# Patient Record
Sex: FEMALE | Race: WHITE | NOT HISPANIC OR LATINO | URBAN - NONMETROPOLITAN AREA
[De-identification: names, ages, dates, MRNs, and addresses within clinical notes are randomized per-mention and may not be internally consistent; named-entity substitution may affect disease eponyms.]

---

## 2017-05-16 NOTE — PATIENT DISCUSSION
Eyelid Lesion Counseling: The patient has presented with one or more eyelid or facial lesions with growth, change, irritation or abnormal appearance. An excision was recommended. Excision of the lesion was performed today and sent to pathology for definitive diagnosis. The patient has been given post procedure written instructions. The patient understands that the results of the pathology report will be back in approximately 7 days. The patient understands that an attempt to call and inform them of the result will be made within 2 weeks. If they do not receive a call they understand that they should contact our office for follow up.

## 2017-05-16 NOTE — PATIENT DISCUSSION
You have had a minor procedure in the office today. In terms of follow-up care, you should wash the affected area each time you wash your face. If the affected area is very close to the eye,  please wash it gently with a diluted baby shampoo. Additionally, please apply antibiotic ointment one time daily to the affected area. If an antibiotic ointment was prescribed to you, please use that ointment as directed. If there was no prescription given, then please use an over-the-counter antibiotic ointment you may have at home.

## 2017-05-16 NOTE — PATIENT DISCUSSION
If you have had a biopsy, a scab should develop over the area in 24 to 48 hours. The scab will persist for several days even up to a week and then fall off. You may discontinue all of these measures as soon as the scab has fallen off entirely. Please do not pick the scab but allow it to fall off naturally.

## 2017-05-16 NOTE — PATIENT DISCUSSION
The patient had a lesion on the right lower eyelid. After informed consent the lesion was anesthetized with local anesthetic, 1% lidocane with epinephrine 1:100,000 units. Sterile technique was used to remove the lesion with Sade scissors. Antibiotic ointment was used to treat the area where lesion was removed. Lesion was sent to pathology for analysis. The patient was given written post operative wound care instructions and a prescription for antibiotic ointment. The patient was asked to call  within 10 days if they had not been otherwise called by our office with the result of the biopsy.

## 2018-04-03 NOTE — PATIENT DISCUSSION
CONJUNCTIVITIS (ALLERGIC), OU:  PRESCRIBE PAZEO QAM OU FOR ITCH RELIEF. REMOVE CONTACT LENSES PRIOR TO USE. MAY SUB PATANOL BID OU IF NEEDED. RETURN FOR FOLLOW-UP AS SCHEDULED.

## 2018-04-03 NOTE — PATIENT DISCUSSION
CATARACTS, OU: BECOMING VISUALLY SIGNIFICANT AND BOTHERSOME TO PATIENT. IN-DEPTH DISCUSSION REGARDING DIAGNOSIS AND VISUAL LIMITATIONS WITH MONOVISION CONTACT LENSES AND CATARACTS DESPITE CL RX CHANGE. TRIAL LENSES SENT HOME WITH PATIENT OF NEW AS WELL AS OLD CONTACT LENS RX. PATIENT TO CALL IF SHE PREFERS HER OLD RX. IF SHE DESIRES TO PROCEED WITH CATARACT EVALUATION, SHE WILL SCHEDULE EVAL FOR THE FALL DUE TO HER WORK SCHEDULE. SPEC RX OFFERED. FOLLOW AS SCHEDULED.

## 2018-04-03 NOTE — PATIENT DISCUSSION
DRY EYE SYNDROME, OU: PRESCRIBED BLINK ARTIFICIAL TEARS BID - QID, OU. RETURN FOR FOLLOW-UP AS SCHEDULED OR SOONER IF SYMPTOMS WORSEN.

## 2018-05-07 NOTE — PATIENT DISCUSSION
CATARACT, OU - VISUALLY SIGNIFICANT. SCHEDULE PHACO WITH IOL OS FOR NEAR FIRST THEN OD SET FOR DISTANCE IF VISUAL SYMPTOMS PERSIST. GLASSES RX GIVEN TO FILL IF DESIRES IN THE EVENT PATIENT DOES NOT PROCEED WITH SURGERY. EXPLAINED TO PATIENT THAT SHE WILL HAVE TO WAIT 48 HOURS AFTER CATARACT SURGERY BEFORE DOING YOGA. PATIENT WOULD LIKE TO DECLINE IV SEDATION DURING CATARACT SURGERY.

## 2018-06-05 NOTE — PATIENT DISCUSSION
CATARACT, OU - VISUALLY SIGNIFICANT. SCHEDULE PHACO WITH IOL OS FOR NEAR FIRST THEN OD SET FOR DISTANCE IF VISUAL SYMPTOMS PERSIST.

## 2018-06-05 NOTE — PATIENT DISCUSSION
New Prescription: Durezol (difluprednate): drops: 0.05% 1 drop three times a day as directed into left eye 06-

## 2018-06-05 NOTE — PATIENT DISCUSSION
New Prescription: Besivance (besifloxacin): drops,suspension: 0.6% 1 drop three times a day as directed into left eye 06-

## 2018-06-20 NOTE — PATIENT DISCUSSION
Continue: Besivance (besifloxacin): drops,suspension: 0.6% 1 drop three times a day as directed into left eye 06-

## 2018-06-20 NOTE — PATIENT DISCUSSION
Continue: Durezol (difluprednate): drops: 0.05% 1 drop three times a day as directed into left eye 06-

## 2018-07-02 NOTE — PATIENT DISCUSSION
Continue to taper as directed in left eye. Start immediately after surgery in right eye sand continue as directed.

## 2018-07-02 NOTE — PATIENT DISCUSSION
*Pre-Op 2nd Eye Counseling: The patient has noticed an improvement in their visual symptoms in the operative eye. The patient complains of decreased vision in the fellow eye when driving and dealing with glare. It was explained to the patient that the decision to proceed with cataract surgery in the fellow eye is entirely a separate decision from the surgical eye. All of the same risks, benefits and alternatives ere reviewed with the patient again. The patient does feel the vision in the non-operative eye is limiting their daily activities and elects to proceed with surgery in the cataract eye.

## 2018-07-02 NOTE — PATIENT DISCUSSION
Continue as directed in left eye. Start two days prior to surgery in right eye and continue as directed.

## 2018-07-11 NOTE — PATIENT DISCUSSION
S/P PC IOL, OD : GOOD POST OP RESULT. STOP ANTIBIOTIC DROP IN ONE WEEK. CONTINUE OTHER DROPS AS DIRECTED UNTIL FURTHER INSTRUCTION. RETURN FOR FOLLOW-UP IN 2 WEEKS.

## 2018-08-02 NOTE — PATIENT DISCUSSION
S/P PE IOL, OU: DOING WELL. CONTINUE DROPS AS DIRECTED. SPECS RX OFFERED. RX ARC IN SPECS TO MINIMIZE GLARE. RETURN FOR FOLLOW-UP AS SCHEDULED.

## 2018-08-02 NOTE — PATIENT DISCUSSION
Post-Op Instructions OD: Durezol 1 time per day for 1 week, then discontinue. Ilevro (Nepafenac) 1 time per day for 1 week, then discontinue.

## 2018-10-04 NOTE — PATIENT DISCUSSION
POSTERIOR VITREOUS DETACHMENT, OD: NO HOLES OR TEARS 360' WITH INDIRECT EXAM OU. RETURN FOR FOLLOW-UP AS SCHEDULED.

## 2018-10-04 NOTE — PATIENT DISCUSSION
VISUAL DISTURBANCE, OS: SYMPTOMS ARE CONSISTENT WITH TEMPORAL INCISION FROM CATARACT SURGERY. PATIENT IS NOT BOTHERED AT THIS TIME. MONITOR.

## 2018-10-04 NOTE — PATIENT DISCUSSION
EYELID LESION, LOWER EYELID, OD:  HISTORY OF PREVIOUS LID LESION REMOVAL WITH DR. Jaclyn Ng. REFER TO DR Jaclyn Ng FOR EVALUATION.

## 2018-11-13 NOTE — PATIENT DISCUSSION
PCF, OU: BECOMING VISUALLY SIGNIFICANT AND BOTHERSOME TO PATIENT. REFER TO DR. GARZA FOR PCF EVAL OU.

## 2018-11-13 NOTE — PATIENT DISCUSSION
POSTERIOR VITREOUS DETACHMENT, OD: STABLE. NO HOLES OR TEARS 360' WITH INDIRECT EXAM OU. RETURN FOR FOLLOW-UP AS SCHEDULED.

## 2018-11-13 NOTE — PATIENT DISCUSSION
TINGLING OF TONGUE: DISCUSSED THAT IT WAS VERY UNLIKELY FOR POSTOPERATIVE DROPS TO BE CORRELATED WITH SYMPTOMS, ESPECIALLY NOW THAT SHE HASN'T USED THEM SINCE THIS SUMMER. RECOMMEND FOLLOWING UP WITH PCP IF SYMPTOMS DO NOT IMPROVE.

## 2018-11-19 NOTE — PATIENT DISCUSSION
ALLERGIC CONJUNCTIVITIS OU: CAN PRESCRIBE PAZEO TO USE QAM PRN FOR ITCHING AND IRRITATION. CALL IF NO IMPROVEMENT IN SYMPTOMS.

## 2018-11-26 NOTE — PATIENT DISCUSSION
NOAH, OD: SLOWLY RESOLVING. PRESCRIBED WARM COMPRESSES 10 MINUTES QID AND MAXITROL OINTMENT BID APPLIED WITH LID MASSAGE FOLLOWING WARM COMPRESSES. PATIENT ADVISED TO RETURN TO CLINIC FOR FOLLOW UP IN 1 MONTH WITH DR SOLANO IF THERE IS NOT FULL RESOLUTION, OR SOONER IF SYMPTOMS WORSEN.

## 2018-11-26 NOTE — PATIENT DISCUSSION
New Prescription: Maxitrol (neomycin-polymyxin-dexameth): ointment: 3.5-10,000-0.1 mg-unit/g-% a small amount twice a day into right eye 11-

## 2019-01-22 NOTE — PATIENT DISCUSSION
PHOTOGRAPHS: I have reviewed the external ocular photographs of this patient which show the following: moderate lower eyelid dermatochalasis

## 2019-02-14 ENCOUNTER — IMPORTED ENCOUNTER (OUTPATIENT)
Dept: URBAN - NONMETROPOLITAN AREA CLINIC 1 | Facility: CLINIC | Age: 44
End: 2019-02-14

## 2019-02-14 PROBLEM — H52.4: Noted: 2019-02-14

## 2019-02-14 PROBLEM — H52.03: Noted: 2021-11-22

## 2019-02-14 PROBLEM — H16.223: Noted: 2019-02-14

## 2019-02-14 PROBLEM — H52.13: Noted: 2019-02-14

## 2019-02-14 PROCEDURE — S0620 ROUTINE OPHTHALMOLOGICAL EXA: HCPCS

## 2019-02-14 NOTE — PATIENT DISCUSSION
MIKE OU- Discussed diagnosis in detail with patient- Discussed signs and symptoms of progression- Recommend drinking plenty of water and starting Omega 3's - Recommend Refresh or Systane through out the day- Consider Restasis or plugs in the future if no improvement - Continue to monitorMyopia / Presbyopia OU- Discussed diangosis in detail with patient- New glasses Rx given today- Continue to monitor- RTC 1 year completeNOTE:  Patient to let us know if she has family hx of glaucoma.  If her IOP goes up we may need to do further testing.; 's Notes: MR 2/23/17DFE 2/23/17

## 2021-11-22 ENCOUNTER — IMPORTED ENCOUNTER (OUTPATIENT)
Dept: URBAN - NONMETROPOLITAN AREA CLINIC 1 | Facility: CLINIC | Age: 46
End: 2021-11-22

## 2021-11-22 PROCEDURE — 99213 OFFICE O/P EST LOW 20 MIN: CPT

## 2021-11-22 PROCEDURE — 92015 DETERMINE REFRACTIVE STATE: CPT

## 2021-11-22 NOTE — PATIENT DISCUSSION
MIKE OU- Discussed diagnosis in detail with patient- Discussed signs and symptoms of progression- Recommend drinking plenty of water and starting Omega 3's - Recommend Refresh or Systane through out the day- Continue to monitorHyperopia / Presbyopia OU- Discussed diangosis in detail with patient- New glasses Rx given today- Continue to monitor- RTC 1 year completeNOTE:  Patient to let us know if she has family hx of glaucoma.  If her IOP goes up we may need to do further testing.; 's Notes: MR 2/23/17DFE 2/23/17

## 2022-04-15 ASSESSMENT — VISUAL ACUITY
OS_CC: 20/40
OD_CC: 20/50
OS_PH: 20/30
OD_PH: 20/40
OS_CC: 20/25+
OD_CC: 20/25

## 2022-04-15 ASSESSMENT — TONOMETRY
OD_IOP_MMHG: 19
OS_IOP_MMHG: 19
OD_IOP_MMHG: 18
OS_IOP_MMHG: 17

## 2022-08-15 NOTE — PATIENT DISCUSSION
Instructed patient to start regiment of artificial tears 5-6 times daily and follow back in 1 month . If no improvement on follow up will flush tear ducts .

## 2022-09-01 ENCOUNTER — EMERGENCY VISIT (OUTPATIENT)
Dept: URBAN - NONMETROPOLITAN AREA CLINIC 1 | Facility: CLINIC | Age: 47
End: 2022-09-01

## 2022-09-01 DIAGNOSIS — H16.141: ICD-10-CM

## 2022-09-01 PROCEDURE — 99213 OFFICE O/P EST LOW 20 MIN: CPT

## 2022-09-01 ASSESSMENT — VISUAL ACUITY
OS_CC: 20/30-1
OD_CC: 20/25

## 2022-09-01 ASSESSMENT — TONOMETRY: OS_IOP_MMHG: 14

## 2022-09-01 NOTE — PATIENT DISCUSSION
MIKE OU- Discussed diagnosis in detail with patient- Discussed signs and symptoms of progression- Recommend drinking plenty of water and starting Omega 3's - Recommend Refresh or Systane through out the day- Continue to monitorHyperopia / Presbyopia OU- Discussed diangosis in detail with patient- New glasses Rx given today- Continue to monitor- RTC 1 year completeNOTE:  Patient to let us know if she has family hx of glaucoma. If her IOP goes up we may need to do further testing.; 's Notes: MR 2/23/17DFE 2/23/17.

## 2022-11-28 ENCOUNTER — ESTABLISHED PATIENT (OUTPATIENT)
Dept: URBAN - NONMETROPOLITAN AREA CLINIC 1 | Facility: CLINIC | Age: 47
End: 2022-11-28

## 2022-11-28 DIAGNOSIS — H16.223: ICD-10-CM

## 2022-11-28 DIAGNOSIS — H10.423: ICD-10-CM

## 2022-11-28 DIAGNOSIS — H52.4: ICD-10-CM

## 2022-11-28 PROCEDURE — 99214 OFFICE O/P EST MOD 30 MIN: CPT

## 2022-11-28 PROCEDURE — 92015 DETERMINE REFRACTIVE STATE: CPT

## 2022-11-28 ASSESSMENT — VISUAL ACUITY
OS_CC: 20/40
OD_CC: 20/25-1

## 2022-11-28 ASSESSMENT — TONOMETRY
OS_IOP_MMHG: 14
OD_IOP_MMHG: 14

## 2022-11-28 NOTE — PATIENT DISCUSSION
(Allergic) Recommend OTC Pataday, Zaditor, or Alaway QD OU, PRN itching. Condition discussed with patient. Avoidance of allergens recommended.

## 2023-11-29 ENCOUNTER — ESTABLISHED PATIENT (OUTPATIENT)
Dept: URBAN - NONMETROPOLITAN AREA CLINIC 1 | Facility: CLINIC | Age: 48
End: 2023-11-29

## 2023-11-29 DIAGNOSIS — H52.4: ICD-10-CM

## 2023-11-29 PROCEDURE — 92014 COMPRE OPH EXAM EST PT 1/>: CPT

## 2023-11-29 PROCEDURE — 92015 DETERMINE REFRACTIVE STATE: CPT

## 2023-11-29 ASSESSMENT — VISUAL ACUITY
OS_PH: 20/40
OD_BAT: 20/80
OD_PH: 20/60
OS_SC: 20/80
OD_SC: 20/80
OS_BAT: 20/80

## 2023-11-29 ASSESSMENT — TONOMETRY
OD_IOP_MMHG: 14
OS_IOP_MMHG: 14

## 2024-07-04 NOTE — PATIENT DISCUSSION
03/25/2019OS-1.75+0.5010+2.5020/20 -&nbsp;SN &nbsp; &nbsp; mlw
Allergic Conjunctivitis Counseling: The clinical exam and history are most compatible with an allergic conjunctivitis or allergy symptoms. I have explained the etiology of the allergies. Drops were prescribed to use when symptoms are present and patient was encouraged to avoid rubbing their eyes. The patient understands that continued monitoring is important to make sure the symptoms resolve with current therapy and to check for changes that may suggest an alternative diagnosis. Return for follow-up as scheduled or sooner if symptoms worsen.
Anti-reflective
CONJUNCTIVITIS (ALLERGIC), OU  PRESCRIBE OLOPATADINE BID OU FOR ITCH RELIEF AS NEEDED. RETURN FOR FOLLOW-UP AS SCHEDULED.
COUNSELING:
DERMATOCHALASIS (UPPER LIDS), OU:  NOT BOTHERSOME TO THE PATIENT AT THIS TIME. FOLLOW UP WITH DR. Azael Veliz IF SYMPTOMS WORSEN.
DRY EYE SYNDROME, OU: PRESCRIBED ARTIFICIAL TEARS BID - QID, OU. RETURN FOR FOLLOW-UP AS SCHEDULED OR SOONER IF SYMPTOMS WORSEN.
Dermatochalasis Counseling:  I have explained the diagnosis of dermatochalasis to the patient. The resulting symptoms, including but not limited to visual field deficits, blepharitis, and/or dry eye symptoms from eyelid wick syndrome, that may result if left untreated were discussed with the patient. The patient understands that intervention is elective surgery and consultation with an oculoplastic specialist can be scheduled at their convenience.
Dry Eye Syndrome Counseling: I have discussed the diagnosis and the pathophysiology of this disease with the patient. Eyelid pathology and systemic illnesses such as Sjogren's disease or rheumatoid arthritis may contribute to severity. Vision may be limited by dry eye, and symptoms exacerbated by environmental factors such as smoke, wind, or prolonged eye use. Treatment options include, but are not limited to, artificial tears, punctal plugs, topical cyclosporine, oral omega-3 supplements, Lipiflow, moisture goggles, and lubricating ointments. I stressed the importance of compliance with treatment.
General:
Glasses Prescribed:
Lens Material:
Lens Treatment:
Medications:
New Prescription: olopatadine (olopatadine): drops: 0.2% 1 drop twice a day as directed into both eyes 03-
POSTERIOR VITREOUS DETACHMENT, OU: STABLE OU. NO HOLES OR TEARS 360' WITH INDIRECT EXAM OU. RETURN FOR FOLLOW-UP AS SCHEDULED.
Posterior Vitreous Detachment Counseling: I have discussed the diagnosis of PVD with the patient and the possibility of a retinal tear or detachment. The signs/symptoms of a retinal tear/detachment were reviewed to include but not limited to redness, discharge, pain, increase or change in flashes of light, increase or change in floaters, decreased vision, part of the vision missing, veils or any other ocular concerns. I have further explained not all patients who develop a tear or detachment have notable symptoms, therefore, compliance with return visits are necessary. The patient was instructed to contact us immediately if they noticed any of the signs or symptoms of retinal detachment as noted above as the prognosis for any potential treatment options may be time related. Return for follow-up as scheduled.
Progressive - Daily wearOD-0.50+0.7515+2.5020/20 -&nbsp;SN &nbsp; &nbsp; mlw
Slab Off:No
UV Protection
Vertex Distance:
spherecylinderaxisaddprismvertexVAInt VANVExaminer
4 = No assist / stand by assistance